# Patient Record
Sex: FEMALE | Race: BLACK OR AFRICAN AMERICAN | NOT HISPANIC OR LATINO
[De-identification: names, ages, dates, MRNs, and addresses within clinical notes are randomized per-mention and may not be internally consistent; named-entity substitution may affect disease eponyms.]

---

## 2020-04-25 ENCOUNTER — MESSAGE (OUTPATIENT)
Age: 40
End: 2020-04-25

## 2023-06-16 ENCOUNTER — EMERGENCY (EMERGENCY)
Facility: HOSPITAL | Age: 43
LOS: 1 days | Discharge: HOME | End: 2023-06-16
Attending: EMERGENCY MEDICINE | Admitting: EMERGENCY MEDICINE
Payer: OTHER MISCELLANEOUS

## 2023-06-16 VITALS
DIASTOLIC BLOOD PRESSURE: 96 MMHG | SYSTOLIC BLOOD PRESSURE: 134 MMHG | TEMPERATURE: 98 F | HEART RATE: 98 BPM | OXYGEN SATURATION: 100 % | RESPIRATION RATE: 16 BRPM

## 2023-06-16 DIAGNOSIS — Z98.891 HISTORY OF UTERINE SCAR FROM PREVIOUS SURGERY: Chronic | ICD-10-CM

## 2023-06-16 PROCEDURE — 99283 EMERGENCY DEPT VISIT LOW MDM: CPT

## 2023-06-16 RX ORDER — IBUPROFEN 200 MG
400 TABLET ORAL ONCE
Refills: 0 | Status: COMPLETED | OUTPATIENT
Start: 2023-06-16 | End: 2023-06-16

## 2023-06-16 RX ADMIN — Medication 400 MILLIGRAM(S): at 17:29

## 2023-06-16 NOTE — ED PROVIDER NOTE - PATIENT PORTAL LINK FT
You can access the FollowMyHealth Patient Portal offered by [facility] by registering at the following website: [email or web address]. By joining [employer]’s FollowMyHealth portal, you will also be able to view your health information using other applications (apps) compatible with our system.
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
## 2023-06-16 NOTE — ED PROVIDER NOTE - NSFOLLOWUPINSTRUCTIONS_ED_ALL_ED_FT
A head injury can include your scalp, face, skull, or brain and range from mild to severe. Effects can appear immediately after the injury or develop later. The effects may last a short time or be permanent. Healthcare providers may want to check your recovery over time. Treatment may change as you recover or develop new health problems from the head injury.    Call your local emergency number (911 in the [place]), or have someone else call if:  You cannot be woken.  You have a seizure.  You stop responding to others or you faint.  You have blurry or double vision.  Your speech becomes slurred or confused.  You have arm or leg weakness, loss of feeling, or new problems with coordination.  Your pupils are larger than usual, or one pupil is a different size than the other.  You have blood or clear fluid coming out of your ears or nose.  Seek care immediately if:  You have repeated or forceful vomiting.  You feel confused.  Your headache gets worse or becomes severe.  You or someone caring for you notices that you are harder to wake than usual.  Call your doctor if:  Your symptoms last longer than 6 weeks after the injury.  You have questions or concerns about your condition or care.  Medicines:  Acetaminophen decreases pain and fever. It is available without a doctor's order. Ask how much to take and how often to take it. Follow directions. Read the labels of all other medicines you are using to see if they also contain acetaminophen, or ask your doctor or pharmacist. Acetaminophen can cause liver damage if not taken correctly. You can also take ibuprofen for swelling.  Take your medicine as directed. Contact your healthcare provider if you think your medicine is not helping or if you have side effects. Tell your provider if you are allergic to any medicine. Keep a list of the medicines, vitamins, and herbs you take. Include the amounts, and when and why you take them. Bring the list or the pill bottles to follow-up visits. Carry your medicine list with you in case of an emergency. You can follow up with our specialty clinic: [facility] [phone] or [phone]. or Concussion program [phone].     A head injury can include your scalp, face, skull, or brain and range from mild to severe. Effects can appear immediately after the injury or develop later. The effects may last a short time or be permanent. Healthcare providers may want to check your recovery over time. Treatment may change as you recover or develop new health problems from the head injury.    Call your local emergency number (771 in the ), or have someone else call if:  You cannot be woken.  You have a seizure.  You stop responding to others or you faint.  You have blurry or double vision.  Your speech becomes slurred or confused.  You have arm or leg weakness, loss of feeling, or new problems with coordination.  Your pupils are larger than usual, or one pupil is a different size than the other.  You have blood or clear fluid coming out of your ears or nose.  Seek care immediately if:  You have repeated or forceful vomiting.  You feel confused.  Your headache gets worse or becomes severe.  You or someone caring for you notices that you are harder to wake than usual.  Call your doctor if:  Your symptoms last longer than 6 weeks after the injury.  You have questions or concerns about your condition or care.  Medicines:  Acetaminophen decreases pain and fever. It is available without a doctor's order. Ask how much to take and how often to take it. Follow directions. Read the labels of all other medicines you are using to see if they also contain acetaminophen, or ask your doctor or pharmacist. Acetaminophen can cause liver damage if not taken correctly. You can also take ibuprofen for swelling.  Take your medicine as directed. Contact your healthcare provider if you think your medicine is not helping or if you have side effects. Tell your provider if you are allergic to any medicine. Keep a list of the medicines, vitamins, and herbs you take. Include the amounts, and when and why you take them. Bring the list or the pill bottles to follow-up visits. Carry your medicine list with you in case of an emergency.

## 2023-06-16 NOTE — ED PROVIDER NOTE - CLINICAL SUMMARY MEDICAL DECISION MAKING FREE TEXT BOX
41 yo Female with no significant PMhx, p/w L facial pain. Vitals stable. Physical exam with tenderness to palpation over L lateral eyebrow. Neuro intact. No hemotympanum, no nasal hematoma. Does not require head CT as per [place] head CT criteria. Will treat pain.

## 2023-06-16 NOTE — ED PROVIDER NOTE - PHYSICAL EXAMINATION
Const: not in acute distress  Eyes: no conjunctival injection, EOMI, PERRL  HEENT: Mild tenderness to palpation of L eyebrow. Moist MM. No hemotympanum, no nasal hematoma.   Neck: Trachea midline.   CVS: +S1/S2, Peripheral pulses 2+ and equal in all extremities.  RESP: Unlabored respiratory effort. Clear to auscultation bilaterally.  GI: Nontender/Nondistended, No CVA tenderness b/l.   MSK: Normocephalic/Atraumatic, No Lower Extremities edema b/l.   Skin: Intact.   Neuro: CNs II-XII grossly intact. Motor & Sensation grossly intact.  Psych: Awake, Alert, & Oriented (AAO) x3.
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
## 2023-11-15 NOTE — ED ADULT NURSE NOTE - NS ED PATIENT SAFETY CONCERN
Caller: Alecia Patel    Relationship: Self    Best call back number: [phone]    What is the best time to reach you: ANY    Who are you requesting to speak with (clinical staff, provider,  specific staff member): CLINICAL      What was the call regarding: MEDICATION HAS NOT YET BEEN SENT TO THE PHARMACY (AMLODIPINE)    Is it okay if the provider responds through MyChart: NON        
Amlodipine 5 mg sent   
No

## 2024-04-16 ENCOUNTER — EMERGENCY (EMERGENCY)
Facility: HOSPITAL | Age: 44
LOS: 1 days | Discharge: HOME | End: 2024-04-16
Admitting: EMERGENCY MEDICINE
Payer: OTHER MISCELLANEOUS

## 2024-04-16 VITALS
HEART RATE: 77 BPM | OXYGEN SATURATION: 99 % | DIASTOLIC BLOOD PRESSURE: 84 MMHG | TEMPERATURE: 98 F | SYSTOLIC BLOOD PRESSURE: 137 MMHG | RESPIRATION RATE: 17 BRPM

## 2024-04-16 DIAGNOSIS — Z98.891 HISTORY OF UTERINE SCAR FROM PREVIOUS SURGERY: Chronic | ICD-10-CM

## 2024-04-16 PROBLEM — Z78.9 OTHER SPECIFIED HEALTH STATUS: Chronic | Status: ACTIVE | Noted: 2023-06-16

## 2024-04-16 PROCEDURE — 73564 X-RAY EXAM KNEE 4 OR MORE: CPT | Mod: 26,LT

## 2024-04-16 PROCEDURE — 73610 X-RAY EXAM OF ANKLE: CPT | Mod: 26,LT

## 2024-04-16 PROCEDURE — 99284 EMERGENCY DEPT VISIT MOD MDM: CPT

## 2024-04-16 RX ORDER — IBUPROFEN 200 MG
600 TABLET ORAL ONCE
Refills: 0 | Status: COMPLETED | OUTPATIENT
Start: 2024-04-16 | End: 2024-04-16

## 2024-04-16 RX ORDER — ACETAMINOPHEN 500 MG
650 TABLET ORAL ONCE
Refills: 0 | Status: COMPLETED | OUTPATIENT
Start: 2024-04-16 | End: 2024-04-16

## 2024-04-16 RX ADMIN — Medication 650 MILLIGRAM(S): at 18:16

## 2024-04-16 RX ADMIN — Medication 600 MILLIGRAM(S): at 18:16
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
## 2024-04-16 NOTE — ED PROVIDER NOTE - CLINICAL SUMMARY MEDICAL DECISION MAKING FREE TEXT BOX
Patient currently afebrile, hemodynamically stable, spO2 100%. Based on history and physical, differentials include but are not limited to contusion vs ligamentous injury. Low suspicion for acute fx however will eval with XR. No labs indicated at this time. Will administer medications for symptomatic relief, follow-up on results, likely d/c home with supportive care and ortho f/u. Patient currently afebrile, hemodynamically stable, spO2 100%. Based on history and physical, differentials include but are not limited to contusion vs ligamentous injury. Low suspicion for acute fx however will eval with XR. No labs indicated at this time. Will administer medications for symptomatic relief, follow-up on results, likely d/c home with supportive care and ortho f/u if needed.
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
## 2024-04-16 NOTE — ED PROVIDER NOTE - NSFOLLOWUPINSTRUCTIONS_ED_ALL_ED_FT
You were seen in the ED for left knee, leg and ankle pain. Your X-rays did not show any fracture or dislocation. Your pain is likely related to a contusion from the injury.     Take Tylenol 650mg (Two 325 mg pills) every 4-6 hours as needed for pain or fevers. Take Motrin (also sold as Advil or Ibuprofen) 400-600 mg (two or three 200 mg over the counter pills) every 8 hours as needed for moderate pain or fevers-- take with food; do not take for more than 5 consecutive days.    If pain does not improve within 5 days, please follow-up with an orthopedist.     Return to the ED if you develop any new or worsening symptoms including severe pain, an inability to walk, swelling or redness around the area, numbness or tingling.    Contusion in Adults  WHAT YOU NEED TO KNOW:  A contusion is a bruise that appears on your skin after an injury. A bruise happens when small blood vessels tear but skin does not. Blood leaks into nearby tissue, such as soft tissue or muscle.  DISCHARGE INSTRUCTIONS:  Seek care immediately if:   •You have new trouble moving the injured area.  •You have tingling or numbness in or near the injured area.  •Your hand or foot below the bruise gets cold or turns pale.  Call your doctor if:   •You find a new lump in the injured area.  •Your symptoms do not improve with treatment after 4 to 5 days.  •You have questions or concerns about your condition or care.  Medicines: You may need any of the following:   •NSAIDs, such as ibuprofen, help decrease swelling, pain, and fever. This medicine is available with or without a doctor's order. NSAIDs can cause stomach bleeding or kidney problems in certain people. If you take blood thinner medicine, always ask your healthcare provider if NSAIDs are safe for you. Always read the medicine label and follow directions.  •Prescription pain medicine may be given. Ask your healthcare provider how to take this medicine safely. Some prescription pain medicines contain acetaminophen. Do not take other medicines that contain acetaminophen without talking to your healthcare provider. Too much acetaminophen may cause liver damage. Prescription pain medicine may cause constipation. Ask your healthcare provider how to prevent or treat constipation.   •Take your medicine as directed. Contact your healthcare provider if you think your medicine is not helping or if you have side effects. Tell him or her if you are allergic to any medicine. Keep a list of the medicines, vitamins, and herbs you take. Include the amounts, and when and why you take them. Bring the list or the pill bottles to follow-up visits. Carry your medicine list with you in case of an emergency.  Help a contusion heal:   •Rest the injured area or use it less than usual. If you bruised your leg or foot, you may need crutches or a cane to help you walk. This will help you keep weight off your injured body part.  •Apply ice to decrease swelling and pain. Ice may also help prevent tissue damage. Use an ice pack, or put crushed ice in a plastic bag. Cover it with a towel and place it on your bruise for 15 to 20 minutes every hour or as directed.  •Use compression to support the area and decrease swelling. Wrap an elastic bandage around the area over the bruised muscle. Make sure the bandage is not too tight. You should be able to fit 1 finger between the bandage and your skin.  •Elevate (raise) your injured body part above the level of your heart to help decrease pain and swelling. Use pillows, blankets, or rolled towels to elevate the area as often as you can.  •Do not drink alcohol as directed. Alcohol may slow healing.  •Do not stretch injured muscles right after your injury. Ask your healthcare provider when and how you may safely stretch after your injury. Gentle stretches can help increase your flexibility.•Do not massage the area or put heating pads on the bruise right after your injury. Heat and massage may slow healing. Your healthcare provider may tell you to apply heat after several days. At that time, heat will start to help the injury heal.  Prevent another contusion:   •Stretch and warm up before you play sports or exercise.  •Wear protective gear when you play sports. Examples are shin guards and padding.   •If you begin a new physical activity, start slowly to give your body a chance to adjust.  Follow up with your doctor as directed: Write down your questions so you remember to ask them during your visits.

## 2024-04-16 NOTE — ED PROVIDER NOTE - PROGRESS NOTE DETAILS
ILDA Arambula: Workup reviewed. XR with no acute findings. Results endorsed to pt - copy of reports provided to patient.   Shared Decision Making - Reassessment performed. Pt with improvement in pain at this time. Discussed findings from XR with patient. Pain likely related to contusion. Pt feels well enough to go home at this time.   Patient is medically stable for discharge. Strict return precautions given. Patient to follow up with PMD. Patient displays understanding and agreeable with plan, comfortable with discharge plan home.

## 2024-04-16 NOTE — ED PROVIDER NOTE - NSFOLLOWUPCLINICSTOKEN_GEN_ALL_ED_FT
[id number]: || ||00\01||False;[id number]: || ||00\01||False;[id number]: || ||00\01||False;[id number]: || ||00\01||False;

## 2024-04-16 NOTE — ED PROVIDER NOTE - PHYSICAL EXAMINATION
General: Well appearing in no acute distress, alert and cooperative  Neck: Soft and supple, full ROM of neck  Cardiac: Regular rate and regular rhythm, no murmurs, peripheral pulses 2+ and symmetric in all extremities, no LE edema.  Resp: Unlabored respiratory effort, lungs CTAB, speaking in full sentences, no wheezes  Abd: Soft, non-tender, non-distended, no guarding or rebound tenderness  MSK: +tenderness to palpation over lateral aspect of left knee. Full ROM of left knee. No overlying swelling or erythema. +tenderness to palpation over lateral malleolus on left ankle. Full ROM of left ankle. No overlying swelling or erythema. Neurovascularly intact. No palpable masses. No ecchymosis on LLE. Compartments soft. No calf tenderness to palpation b/l.   Skin: Warm and dry, no rashes/abrasions/lacerations  Neuro: AO x 3, moves all extremities symmetrically, Motor strength 5/5 bilaterally UE and LE, sensation grossly intact. Normal gait.

## 2024-04-16 NOTE — ED PROVIDER NOTE - NSFOLLOWUPCLINICS_GEN_ALL_ED_FT
[facility] Orthopedic Surgery  Orthopedic Surgery  [address], 3rd & 4th [place] [address]  Phone: [phone]  Fax:     [facility]  Orthopedic Surgery  [address] [address]  Phone: [phone]  Fax:     [facility]  Orthopedic Surgery  [address] [place] [address]  Phone: [phone]  Fax:     [facility]  Orthopedic Surgery  [address] [place] [address]  Phone: [phone]  Fax:
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
## 2024-10-22 ENCOUNTER — EMERGENCY (EMERGENCY)
Facility: HOSPITAL | Age: 44
LOS: 1 days | Discharge: HOME | End: 2024-10-22
Admitting: EMERGENCY MEDICINE
Payer: OTHER MISCELLANEOUS

## 2024-10-22 VITALS
RESPIRATION RATE: 15 BRPM | SYSTOLIC BLOOD PRESSURE: 118 MMHG | HEART RATE: 92 BPM | TEMPERATURE: 98 F | OXYGEN SATURATION: 97 % | WEIGHT: 151.9 LBS | DIASTOLIC BLOOD PRESSURE: 82 MMHG

## 2024-10-22 DIAGNOSIS — Z98.891 HISTORY OF UTERINE SCAR FROM PREVIOUS SURGERY: Chronic | ICD-10-CM

## 2024-10-22 PROCEDURE — 73030 X-RAY EXAM OF SHOULDER: CPT | Mod: 26,RT

## 2024-10-22 PROCEDURE — 99284 EMERGENCY DEPT VISIT MOD MDM: CPT

## 2024-10-22 PROCEDURE — 73562 X-RAY EXAM OF KNEE 3: CPT | Mod: 26,RT

## 2024-10-22 RX ORDER — BACLOFEN 100 %
1 POWDER (GRAM) MISCELLANEOUS
Qty: 6 | Refills: 0
Start: 2024-10-22 | End: 2024-10-27

## 2024-10-22 RX ORDER — LIDOCAINE 50 MG/G
1 CREAM TOPICAL
Qty: 2 | Refills: 0
Start: 2024-10-22 | End: 2024-10-27

## 2024-10-22 RX ORDER — LIDOCAINE 50 MG/G
2 CREAM TOPICAL ONCE
Refills: 0 | Status: COMPLETED | OUTPATIENT
Start: 2024-10-22 | End: 2024-10-22

## 2024-10-22 RX ADMIN — LIDOCAINE 2 PATCH: 50 CREAM TOPICAL at 18:58

## 2024-10-22 RX ADMIN — Medication 400 MILLIGRAM(S): at 18:58

## 2024-10-22 NOTE — ED ADULT NURSE NOTE - NSFALLRISKINTERV_ED_ALL_ED
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
## 2024-10-22 NOTE — ED ADULT NURSE NOTE - CHIEF COMPLAINT QUOTE
ambulatory coming from work from [facility], s/p fall. reports was trying to help a pt from falling. endorsing pain to R shoulder and R knee. denies LOC, hitting head, or use of blood thinners. denies any Phx.

## 2024-10-22 NOTE — ED PROVIDER NOTE - NSFOLLOWUPINSTRUCTIONS_ED_ALL_ED_FT
Fall Prevention in Hospitals, Adult  Staying in the hospital puts you at risk of falling. Falls can cause serious injuries, but they can be prevented.    Make sure you know what puts you at risk for falling and what you and your health care team can do to prevent falls. If you or a loved one falls in the hospital, tell the hospital staff about it.    What can increase my risk of falls?  Factors that increase your risk of falling in the hospital include:  Being in an unfamiliar environment, especially when using the bathroom at night.  Having surgery or being on bed rest.  Taking many medicines or certain types of medicines, such as sleeping pills. Some medicines can cause confusion, trouble with balance, dizziness, or low blood pressure.  Having tubes in place, such as IVs or catheters.  Other risk factors for falls while in the hospital include:  Having trouble with hearing or vision.  Having depression.  Needing to use the toilet frequently.  Having fallen during the past 3 months.  What actions can I take to prevent falls?  A person with an IV in the arm holding a call button.  If you or a loved one has to stay in the hospital:  Ask about which fall prevention strategies will be in place.  Do not get up by yourself if you have been asked to call for help when getting up. Asking for help to get up is for your safety, and the staff is there to help you.  Wear non-skid shoes or non-skid slippers.  Get up slowly, and sit at the side of the bed for a few minutes before standing up.  Keep items you need close to you, such as the call button or a phone, so that you do not need to reach for them.  Wear eyeglasses or hearing aids as told by your health care provider.  Have someone stay in the hospital with you or your loved one.  Ask if sleeping pills or other medicines that can cause confusion or dizziness are necessary if they are prescribed to you or a loved one.  What does the hospital staff do to help prevent falls?  A hospital bed with side rails.  A health care provider helping a person who is wearing a safety belt.  Hospitals have systems in place to prevent falls and accidents, which may include:  Discussing your fall risk and making a personalized fall prevention plan.  Checking in regularly to see if you need help. Some hospitals use video monitoring that allows a staff member to come to you if you need help.  Placing an armband on your wrist or a sign near your room to alert other staff of your needs.  Using an alarm on your hospital bed. This is an alarm that goes off if you get out of bed and forget to call for help.  Keeping the bed in a low and locked position.  Keeping the area around the bed and bathroom well-lit and not cluttered.  Having a staff person stay with you (one-on-one observation), even when you are using the bathroom. This is for your safety.  Using safety equipment, such as:  A belt around your waist.  Walkers, crutches, and other devices for support.  Safety beds, such as low beds, or cushions on the floor next to the bed.  What other actions can I take to prevent falls?  Check in regularly with your provider or pharmacist to review all medicines that you take.  Make sure that you have a regular exercise program to stay physically fit. This will help you maintain your balance.  Talk with a physical therapist if recommended by your provider. A physical therapist can help you learn to do exercises to improve movement and strength.  If you are over 65 years old:  Ask your provider if you need a calcium or vitamin D supplement.  Have your eyes and hearing checked every year.  Have your feet checked every year.  This information is not intended to replace advice given to you by your health care provider. Make sure you discuss any questions you have with your health care provider.

## 2024-10-22 NOTE — ED ADULT TRIAGE NOTE - CHIEF COMPLAINT QUOTE
ambulatory coming from work from [facility], s/p fall. reports was trying to help a pt from falling. endorsing pain to R shoulder and R knee. denies LOC, hitting head, or use of blood thinners. denies any Phx.
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
## 2024-10-22 NOTE — ED PROVIDER NOTE - PATIENT PORTAL LINK FT
You can access the FollowMyHealth Patient Portal offered by [facility] by registering at the following website: [email or web address]. By joining [employer]’s FollowMyHealth portal, you will also be able to view your health information using other applications (apps) compatible with our system.

## 2025-02-19 ENCOUNTER — RESULT REVIEW (OUTPATIENT)
Age: 45
End: 2025-02-19

## 2025-02-19 ENCOUNTER — NON-APPOINTMENT (OUTPATIENT)
Age: 45
End: 2025-02-19

## 2025-07-15 ENCOUNTER — EMERGENCY (EMERGENCY)
Facility: HOSPITAL | Age: 45
LOS: 1 days | End: 2025-07-15
Admitting: EMERGENCY MEDICINE
Payer: OTHER MISCELLANEOUS

## 2025-07-15 VITALS
DIASTOLIC BLOOD PRESSURE: 82 MMHG | TEMPERATURE: 98 F | HEIGHT: 63 IN | OXYGEN SATURATION: 98 % | SYSTOLIC BLOOD PRESSURE: 118 MMHG | HEART RATE: 90 BPM | WEIGHT: 147.93 LBS | RESPIRATION RATE: 16 BRPM

## 2025-07-15 VITALS
SYSTOLIC BLOOD PRESSURE: 133 MMHG | DIASTOLIC BLOOD PRESSURE: 74 MMHG | RESPIRATION RATE: 18 BRPM | HEART RATE: 82 BPM | OXYGEN SATURATION: 100 % | TEMPERATURE: 98 F

## 2025-07-15 DIAGNOSIS — Z98.891 HISTORY OF UTERINE SCAR FROM PREVIOUS SURGERY: Chronic | ICD-10-CM

## 2025-07-15 PROCEDURE — 73110 X-RAY EXAM OF WRIST: CPT | Mod: 26,RT

## 2025-07-15 PROCEDURE — 73080 X-RAY EXAM OF ELBOW: CPT | Mod: 26,RT

## 2025-07-15 PROCEDURE — 73090 X-RAY EXAM OF FOREARM: CPT | Mod: 26,RT

## 2025-07-15 PROCEDURE — 73030 X-RAY EXAM OF SHOULDER: CPT | Mod: 26,RT

## 2025-07-15 PROCEDURE — 99284 EMERGENCY DEPT VISIT MOD MDM: CPT

## 2025-07-15 PROCEDURE — 73060 X-RAY EXAM OF HUMERUS: CPT | Mod: 26,RT

## 2025-07-15 PROCEDURE — 73130 X-RAY EXAM OF HAND: CPT | Mod: 26,RT

## 2025-07-15 RX ORDER — IBUPROFEN 200 MG
600 TABLET ORAL ONCE
Refills: 0 | Status: COMPLETED | OUTPATIENT
Start: 2025-07-15 | End: 2025-07-15

## 2025-07-15 RX ADMIN — Medication 600 MILLIGRAM(S): at 19:48
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
## 2025-07-15 NOTE — ED PROVIDER NOTE - PATIENT PORTAL LINK FT
You can access the FollowMyHealth Patient Portal offered by [facility] by registering at the following website: [email or web address]. By joining [employer]’s FollowMyHealth portal, you will also be able to view your health information using other applications (apps) compatible with our system.
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
## 2025-07-15 NOTE — ED PROVIDER NOTE - NSFOLLOWUPCLINICS_GEN_ALL_ED_FT
[facility]  Sports Medicine  [address] [place] [address]  Phone: [phone]  Fax:
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
## 2025-07-16 PROBLEM — Z78.9 OTHER SPECIFIED HEALTH STATUS: Chronic | Status: ACTIVE | Noted: 2024-10-22
